# Patient Record
Sex: MALE | Race: BLACK OR AFRICAN AMERICAN | NOT HISPANIC OR LATINO | URBAN - NONMETROPOLITAN AREA
[De-identification: names, ages, dates, MRNs, and addresses within clinical notes are randomized per-mention and may not be internally consistent; named-entity substitution may affect disease eponyms.]

---

## 2018-12-18 ENCOUNTER — APPOINTMENT (RX ONLY)
Dept: URBAN - NONMETROPOLITAN AREA CLINIC 27 | Facility: CLINIC | Age: 68
Setting detail: DERMATOLOGY
End: 2018-12-18

## 2018-12-18 DIAGNOSIS — L72.0 EPIDERMAL CYST: ICD-10-CM

## 2018-12-18 PROCEDURE — ? PRESCRIPTION

## 2018-12-18 PROCEDURE — 10060 I&D ABSCESS SIMPLE/SINGLE: CPT

## 2018-12-18 PROCEDURE — ? COUNSELING

## 2018-12-18 PROCEDURE — ? INCISION AND DRAINAGE

## 2018-12-18 RX ORDER — DOXYCYCLINE HYCLATE 100 MG/1
CAPSULE, GELATIN COATED ORAL
Qty: 20 | Refills: 0 | Status: ERX | COMMUNITY
Start: 2018-12-18

## 2018-12-18 RX ADMIN — DOXYCYCLINE HYCLATE: 100 CAPSULE, GELATIN COATED ORAL at 16:08

## 2018-12-18 ASSESSMENT — LOCATION SIMPLE DESCRIPTION DERM: LOCATION SIMPLE: UPPER BACK

## 2018-12-18 ASSESSMENT — LOCATION ZONE DERM: LOCATION ZONE: TRUNK

## 2018-12-18 ASSESSMENT — LOCATION DETAILED DESCRIPTION DERM: LOCATION DETAILED: INFERIOR THORACIC SPINE

## 2018-12-18 NOTE — PROCEDURE: INCISION AND DRAINAGE
Dressing: telfa dressing
Packing?: iodoform packing strips
Wound Care: Bacitracin
Curette: No
Curette Text (Optional): After the contents were expressed a curette was used to partially remove the cyst wall.
Epidermal Sutures: 5-0 Prolene
Size Of Lesion In Cm (Optional But May Be Required For Some Insurances): 1.5
Post-Care Instructions: I reviewed with the patient in detail post-care instructions. Patient should keep wound covered and call the office should any redness, pain, swelling or worsening occur.
Consent was obtained and risks were reviewed including but not limited to delayed wound healing, infection, need for multiple I and D's, and pain.
Drainage Amount?: moderate
Lesion Type: Abscess
Detail Level: Detailed
Anesthesia Volume In Cc: 2.5
Anesthesia Type: 1% lidocaine without epinephrine
Preparation Text: The area was prepped in the usual clean fashion.
Drainage Type?: bloody
Method: 15 blade
Suture Text: The incision was partially closed with
Epidermal Closure: simple interrupted

## 2018-12-21 ENCOUNTER — APPOINTMENT (RX ONLY)
Dept: URBAN - NONMETROPOLITAN AREA CLINIC 19 | Facility: CLINIC | Age: 68
Setting detail: DERMATOLOGY
End: 2018-12-21

## 2018-12-21 DIAGNOSIS — L72.0 EPIDERMAL CYST: ICD-10-CM

## 2018-12-21 PROBLEM — Z92.3 PERSONAL HISTORY OF IRRADIATION: Status: ACTIVE | Noted: 2018-12-21

## 2018-12-21 PROCEDURE — ? TREATMENT REGIMEN

## 2018-12-21 PROCEDURE — 10060 I&D ABSCESS SIMPLE/SINGLE: CPT | Mod: 79

## 2018-12-21 PROCEDURE — ? COUNSELING

## 2018-12-21 PROCEDURE — ? POST-OP WOUND CHECK

## 2018-12-21 PROCEDURE — ? INCISION AND DRAINAGE

## 2018-12-21 ASSESSMENT — LOCATION ZONE DERM: LOCATION ZONE: TRUNK

## 2018-12-21 ASSESSMENT — LOCATION SIMPLE DESCRIPTION DERM: LOCATION SIMPLE: UPPER BACK

## 2018-12-21 ASSESSMENT — LOCATION DETAILED DESCRIPTION DERM: LOCATION DETAILED: INFERIOR THORACIC SPINE

## 2018-12-21 NOTE — PROCEDURE: INCISION AND DRAINAGE
Drainage Type?: bloody
Post-Care Instructions: I reviewed with the patient in detail post-care instructions. Patient should keep wound covered and call the office should any redness, pain, swelling or worsening occur.
Curette Text (Optional): After the contents were expressed a curette was used to partially remove the cyst wall.
Consent was obtained and risks were reviewed including but not limited to delayed wound healing, infection, need for multiple I and D's, and pain.
Epidermal Closure: simple interrupted
Detail Level: Detailed
Dressing: dry sterile dressing
Lesion Type: Cyst
Drainage Amount?: minimal
Anesthesia Type: 2% Xylocaine with 1:100,000 epinephrine
Preparation Text: The area was prepped in the usual clean fashion.
Curette: No
Wound Care: Bacitracin
Packing?: iodoform packing strips
Suture Text: The incision was partially closed with
Size Of Lesion In Cm (Optional But May Be Required For Some Insurances): 3
Method: 15 blade
Epidermal Sutures: 4-0 Ethilon
Anesthesia Volume In Cc: 1

## 2018-12-21 NOTE — PROCEDURE: TREATMENT REGIMEN
Continue Regimen: Doxycycline 100 mg until completed
Plan: Previous dressing and packing removed, irrigated with NS, repacked with sterile iodoform gauze packing and covered with dry dressing. Family will remove packing on Sunday (directions given), cleanse site with saline rinse daily, apply bacitracin daily, and change dressing daily. Follow up scheduled for Thursday (12-27-18)
Detail Level: Zone

## 2018-12-27 ENCOUNTER — APPOINTMENT (RX ONLY)
Dept: URBAN - NONMETROPOLITAN AREA CLINIC 19 | Facility: CLINIC | Age: 68
Setting detail: DERMATOLOGY
End: 2018-12-27

## 2018-12-27 DIAGNOSIS — L72.0 EPIDERMAL CYST: ICD-10-CM | Status: RESOLVING

## 2018-12-27 PROCEDURE — ? POST-OP WOUND CHECK

## 2018-12-27 PROCEDURE — ? COUNSELING

## 2018-12-27 ASSESSMENT — LOCATION SIMPLE DESCRIPTION DERM: LOCATION SIMPLE: UPPER BACK

## 2018-12-27 ASSESSMENT — LOCATION DETAILED DESCRIPTION DERM: LOCATION DETAILED: INFERIOR THORACIC SPINE

## 2018-12-27 ASSESSMENT — LOCATION ZONE DERM: LOCATION ZONE: TRUNK

## 2018-12-27 NOTE — PROCEDURE: POST-OP WOUND CHECK
Add 45454 Cpt? (Important Note: In 2017 The Use Of 54363 Is Being Tracked By Cms To Determine Future Global Period Reimbursement For Global Periods): no
Detail Level: Detailed